# Patient Record
Sex: MALE | Race: WHITE | NOT HISPANIC OR LATINO | Employment: FULL TIME | URBAN - METROPOLITAN AREA
[De-identification: names, ages, dates, MRNs, and addresses within clinical notes are randomized per-mention and may not be internally consistent; named-entity substitution may affect disease eponyms.]

---

## 2017-01-30 ENCOUNTER — HOSPITAL ENCOUNTER (OUTPATIENT)
Dept: SLEEP MEDICINE | Facility: HOSPITAL | Age: 57
Discharge: HOME | End: 2017-01-30
Admitting: INTERNAL MEDICINE

## 2017-01-30 PROCEDURE — G0463 HOSPITAL OUTPT CLINIC VISIT: HCPCS

## 2017-01-31 NOTE — PROGRESS NOTES
DATE OF VISIT: 01/30/2017    REFERRING PHYSICIAN: Ney Leary MD    HISTORY OF PRESENT ILLNESS: This is a very pleasant 56-year-old gentleman here for evaluation for possible sleep-disordered breathing. The patient states that he has been told that he snores loudly and wakes up often. He feels tired in the morning and never rested. He also feels more sleepy as the day progresses. No parasomnias. Occasional alcohol. He said he actually took some propofol during colonoscopy and felt much better in the morning. Weight gain of 15 pounds. He also reports dry mouth and sweating in his sleep. No restless leg was reported. Sleep schedule: Time to bed at 11 and gets up at 7, gets about 7 hours of sleep and feels worn out.     PAST MEDICAL HISTORY: Hyperlipidemia.     MEDICATIONS:   1. Lipitor.  2. Niacin.     FAMILY HISTORY: Heart disease and hypertension.    SOCIAL HISTORY: Works as a consultant director. Smoked occasionally. No alcohol abuse. No caffeine abuse.     REVIEW OF SYSTEMS: Positive for frequent urination, nasal congestion, postnasal drainage, and frequent heartburn. [place] Sleepiness Scale Score is 4 out of 24, within normal limits.     PHYSICAL EXAMINATION:  GENERAL: Awake, alert, no distress.   VITAL SIGNS: Blood pressure 135/90, BMI 29, weight 201 pounds. Heart rate 69. Neck size 16 inches.  HEENT: Mallampati between II and III.   NECK: Supple. No bruit. No adenopathy.   LUNGS: Clear.  HEART: Regular rate and rhythm.   ABDOMEN: Benign.   EXTREMITIES: No edema.   CENTRAL NERVOUS SYSTEM: No deficits.     IMPRESSION: Hypersomnia with hyperlipidemia.     RECOMMENDATIONS: Based on his clinical presentation, sleep apnea is suspected. I discussed pathophysiology and consequence of untreated sleep apnea. The patient is agreeable, wishing to proceed for a split night sleep study. Sleep hygiene measures were discussed in detail. We will follow up and make further recommendations after reviewing the sleep study.          Al Michelle M.D.  TS:[email or web address]  D:   01/30/2017 13:34:48  T:   01/31/2017 03:03:33  Job ID:   [id number]  Document ID:   [id number]  cc:   Ney Leary M.D.

## 2017-02-28 ENCOUNTER — TRANSCRIBE ORDERS (OUTPATIENT)
Dept: PULMONOLOGY | Facility: HOSPITAL | Age: 57
End: 2017-02-28

## 2017-02-28 DIAGNOSIS — G47.33 OSA (OBSTRUCTIVE SLEEP APNEA): Primary | ICD-10-CM

## 2017-03-01 ENCOUNTER — HOSPITAL ENCOUNTER (OUTPATIENT)
Dept: SLEEP MEDICINE | Facility: HOSPITAL | Age: 57
Discharge: HOME | End: 2017-03-01
Admitting: INTERNAL MEDICINE

## 2017-03-01 DIAGNOSIS — G47.33 OSA (OBSTRUCTIVE SLEEP APNEA): ICD-10-CM

## 2017-03-01 PROCEDURE — 95806 SLEEP STUDY UNATT&RESP EFFT: CPT

## 2017-03-20 ENCOUNTER — TELEPHONE (OUTPATIENT)
Dept: SLEEP MEDICINE | Facility: HOSPITAL | Age: 57
End: 2017-03-20

## 2017-03-20 NOTE — TELEPHONE ENCOUNTER
Spoke to patient about his sleep study results with an AHI of 20.6. CPAP orders were sent to [facility] on 3/14/17.

## 2024-05-13 ENCOUNTER — PATIENT ROUNDING (BHMG ONLY) (OUTPATIENT)
Dept: URGENT CARE | Facility: CLINIC | Age: 64
End: 2024-05-13
Payer: COMMERCIAL

## 2024-05-13 NOTE — ED NOTES
Thank you for letting us care for you in your recent visit to our urgent care center. We would love to hear about your experience with us. Was this the first time you have visited our location?    We’re always looking for ways to make our patients’ experiences even better. Do you have any recommendations on ways we may improve?     I appreciate you taking the time to respond. Please be on the lookout for a survey about your recent visit from [email or web address] via text or email. We would greatly appreciate if you could fill that out and turn it back in. We want your voice to be heard and we value your feedback.   Thank you for choosing [facility] for your healthcare needs.